# Patient Record
Sex: FEMALE | URBAN - NONMETROPOLITAN AREA
[De-identification: names, ages, dates, MRNs, and addresses within clinical notes are randomized per-mention and may not be internally consistent; named-entity substitution may affect disease eponyms.]

---

## 2021-03-25 ENCOUNTER — HISTORICAL (OUTPATIENT)
Dept: ADMINISTRATIVE | Facility: HOSPITAL | Age: 46
End: 2021-03-25

## 2022-05-02 ENCOUNTER — HISTORICAL (OUTPATIENT)
Dept: ADMINISTRATIVE | Facility: HOSPITAL | Age: 47
End: 2022-05-02

## 2024-04-04 ENCOUNTER — OFFICE VISIT (OUTPATIENT)
Dept: OBSTETRICS AND GYNECOLOGY | Facility: CLINIC | Age: 49
End: 2024-04-04
Payer: COMMERCIAL

## 2024-04-04 VITALS
SYSTOLIC BLOOD PRESSURE: 132 MMHG | DIASTOLIC BLOOD PRESSURE: 78 MMHG | BODY MASS INDEX: 37.43 KG/M2 | TEMPERATURE: 98 F | HEIGHT: 62 IN | WEIGHT: 203.38 LBS

## 2024-04-04 DIAGNOSIS — N89.8 VAGINAL DISCHARGE: ICD-10-CM

## 2024-04-04 DIAGNOSIS — N89.8 VAGINAL DRYNESS: ICD-10-CM

## 2024-04-04 DIAGNOSIS — Z12.31 ENCOUNTER FOR SCREENING MAMMOGRAM FOR BREAST CANCER: ICD-10-CM

## 2024-04-04 DIAGNOSIS — N94.10 DYSPAREUNIA IN FEMALE: ICD-10-CM

## 2024-04-04 DIAGNOSIS — Z79.890 HORMONE REPLACEMENT THERAPY (HRT): ICD-10-CM

## 2024-04-04 DIAGNOSIS — Z01.419 WELL WOMAN EXAM WITH ROUTINE GYNECOLOGICAL EXAM: Primary | ICD-10-CM

## 2024-04-04 PROCEDURE — 1159F MED LIST DOCD IN RCRD: CPT | Mod: CPTII,,,

## 2024-04-04 PROCEDURE — 99386 PREV VISIT NEW AGE 40-64: CPT | Mod: ,,,

## 2024-04-04 PROCEDURE — 1160F RVW MEDS BY RX/DR IN RCRD: CPT | Mod: CPTII,,,

## 2024-04-04 PROCEDURE — 3008F BODY MASS INDEX DOCD: CPT | Mod: CPTII,,,

## 2024-04-04 PROCEDURE — 3078F DIAST BP <80 MM HG: CPT | Mod: CPTII,,,

## 2024-04-04 PROCEDURE — 3075F SYST BP GE 130 - 139MM HG: CPT | Mod: CPTII,,,

## 2024-04-04 RX ORDER — ESTRADIOL 0.03 MG/D
1 FILM, EXTENDED RELEASE TRANSDERMAL
Qty: 8 PATCH | Refills: 11 | Status: SHIPPED | OUTPATIENT
Start: 2024-04-04 | End: 2024-04-08 | Stop reason: SDUPTHER

## 2024-04-08 ENCOUNTER — TELEPHONE (OUTPATIENT)
Dept: OBSTETRICS AND GYNECOLOGY | Facility: CLINIC | Age: 49
End: 2024-04-08
Payer: COMMERCIAL

## 2024-04-08 ENCOUNTER — HOSPITAL ENCOUNTER (OUTPATIENT)
Dept: RADIOLOGY | Facility: HOSPITAL | Age: 49
Discharge: HOME | End: 2024-04-08
Payer: COMMERCIAL

## 2024-04-08 DIAGNOSIS — Z12.31 ENCOUNTER FOR SCREENING MAMMOGRAM FOR BREAST CANCER: ICD-10-CM

## 2024-04-08 DIAGNOSIS — Z79.890 HORMONE REPLACEMENT THERAPY (HRT): Primary | ICD-10-CM

## 2024-04-08 DIAGNOSIS — A49.1 INFECTION DUE TO ENTEROCOCCUS: Primary | ICD-10-CM

## 2024-04-08 PROCEDURE — 77063 BREAST TOMOSYNTHESIS BI: CPT | Mod: TC

## 2024-04-08 RX ORDER — AMPICILLIN 500 MG/1
500 CAPSULE ORAL EVERY 6 HOURS
Qty: 28 CAPSULE | Refills: 0 | Status: SHIPPED | OUTPATIENT
Start: 2024-04-08 | End: 2024-04-15

## 2024-04-08 RX ORDER — ESTRADIOL 0.03 MG/D
1 FILM, EXTENDED RELEASE TRANSDERMAL
Qty: 8 PATCH | Refills: 11 | Status: SHIPPED | OUTPATIENT
Start: 2024-04-08 | End: 2024-05-30

## 2024-04-08 NOTE — TELEPHONE ENCOUNTER
----- Message from DREW Verduzco sent at 4/8/2024  8:43 AM CDT -----  Please notify pt of +enterococcus on one swab. She will need to be treated with Ampicillin 500mg QID x7 days. Thanks!

## 2024-04-08 NOTE — TELEPHONE ENCOUNTER
Spoke with , will send Rx to [facility] pharmacy. If unable to pick of medication due to cost, will change Rx. Pt voiced understanding.

## 2024-04-08 NOTE — TELEPHONE ENCOUNTER
----- Message from Brylee Guillory sent at 4/8/2024  3:55 PM CDT -----  Regarding: Pt Advice  Contact: Neymar  Type:  RX Refill Request    Who Called: Neymar  Refill or New Rx:  RX Name and Strength:estradioL (VIVELLE-DOT) 0.025 mg/24 hr  How is the patient currently taking it? (ex. 1XDay):  Is this a 30 day or 90 day RX:  Preferred Pharmacy with phone number:  [facility] - [place] - [address] [place] [address]  Phone: [phone] Fax: [phone]  Local or Mail Order:  Ordering Provider:  Would the patient rather a call back or a response via [facility]? Call back  Best Call Back Number:[phone]   Additional Information: Pt is wanting to know if there is a different brand patch that can be prescribed to her. Pt states that the patch that was prescribed is to expensive for her.

## 2024-04-15 ENCOUNTER — TELEPHONE (OUTPATIENT)
Dept: OBSTETRICS AND GYNECOLOGY | Facility: CLINIC | Age: 49
End: 2024-04-15
Payer: COMMERCIAL

## 2024-04-15 NOTE — TELEPHONE ENCOUNTER
----- Message from Brylee Guillory sent at 4/15/2024 11:22 AM CDT -----  Regarding: Results  Contact: CHINTAN  Type:  Test Results    Who Called: Chintan  Name of Test (Lab/Mammo/Etc): Mammogram Results  Date of Test:   Ordering Provider:   Where the test was performed: [facility]  Would the patient rather a call back or a response via [facility]? Call back  Best Call Back Number: [phone]   Additional Information:  Pt called wanting her mammogram results

## 2024-04-15 NOTE — TELEPHONE ENCOUNTER
Per MMG report, Recommend 3D left diagnostic mammogram and possible targeted left breast ultrasound. Our nurse navigator, Raz Dos Santos, will call the patient to facilitate scheduling the patient for diagnostic exam.     Placed call to pt, notified. Voiced understanding. To call our office if not contacted to schedule appt.

## 2024-04-17 ENCOUNTER — TELEPHONE (OUTPATIENT)
Dept: OBSTETRICS AND GYNECOLOGY | Facility: CLINIC | Age: 49
End: 2024-04-17
Payer: COMMERCIAL

## 2024-04-17 NOTE — TELEPHONE ENCOUNTER
Spoke with she stated that she received a phone called already about her breast ultrasound which is scheduled for 5/8/24

## 2024-04-17 NOTE — TELEPHONE ENCOUNTER
----- Message from Gracie Bryant sent at 4/17/2024  1:24 PM CDT -----  Regarding: Ultrasound order    Who Called:  patient  Best Call Back Number:  [phone]  Additional Information:  left message with the answering service stating she needs orders for an ultrasound on breast

## 2024-04-23 ENCOUNTER — HOSPITAL ENCOUNTER (OUTPATIENT)
Dept: RADIOLOGY | Facility: HOSPITAL | Age: 49
Discharge: HOME | End: 2024-04-23
Payer: COMMERCIAL

## 2024-04-23 DIAGNOSIS — R92.8 ABNORMAL MAMMOGRAM: ICD-10-CM

## 2024-04-23 PROCEDURE — 76642 ULTRASOUND BREAST LIMITED: CPT | Mod: 26,LT,, | Performed by: STUDENT IN AN ORGANIZED HEALTH CARE EDUCATION/TRAINING PROGRAM

## 2024-04-23 PROCEDURE — 76642 ULTRASOUND BREAST LIMITED: CPT | Mod: TC,LT

## 2024-04-23 PROCEDURE — 77065 DX MAMMO INCL CAD UNI: CPT | Mod: TC,LT

## 2024-04-23 PROCEDURE — 77061 BREAST TOMOSYNTHESIS UNI: CPT | Mod: 26,LT,, | Performed by: STUDENT IN AN ORGANIZED HEALTH CARE EDUCATION/TRAINING PROGRAM

## 2024-04-23 PROCEDURE — 77065 DX MAMMO INCL CAD UNI: CPT | Mod: 26,LT,, | Performed by: STUDENT IN AN ORGANIZED HEALTH CARE EDUCATION/TRAINING PROGRAM

## 2024-04-23 PROCEDURE — 77061 BREAST TOMOSYNTHESIS UNI: CPT | Mod: TC,LT

## 2024-05-04 NOTE — PROGRESS NOTES
Chief Complaint:   New Gynecological (Annual gyn exam s/p hyst, pt c/o of vaginal dryness and pain with sexual intercourse)     History of Present Illness:  Neymar Nam is a 49 y.o. year old  presents for her well woman exam and establishment of care. She is status post hysterectomy. No vaginal bleeding following hysterectomy. Denies any health changes. She does c/o vaginal dryness with causes intercourse to be painful. Pt desires HRT.     MENOPAUSAL:  Age at menarche: 8  Age at menopause: 25  Hysterectomy:  Yes due to tumor in uterus  Last pap: pt does not remember when  H/o abnl pap: no  Postcoital Bleeding: No  Sexually Active: yes  Dyspareunia: Yes  H/o STI: No   HRT: none  MM22 benign  H/o abnl MMG: no  Colonoscopy:  normal per pt     Review of Systems:  General/Constitutional: Chills denies. Fatigue/weakness denies. Fever denies. Night sweats denies. Hot flashes denies    Respiratory: Cough denies. Hemoptysis denies. SOB denies. Sputum production denies. Wheezing denies .   Cardiovascular: Chest pain denies. Dizziness denies. Palpitations denies. Swelling in hands/feet denies.                Breast: Dimpling denies. Breast mass denies. Breast pain/tenderness denies. Nipple discharge denies. Puckering denies.  Gastrointestinal: Abdominal pain denies. Blood in stool denies. Constipation denies. Diarrhea denies. Heartburn denies. Nausea denies. Vomiting denies    Genitourinary: Incontinence denies. Blood in urine denies. Frequent urination denies. Painful urination denies. Urinary urgency denies. Nocturia denies    Gynecologic: Irregular menses denies. Heavy bleeding denies. Painful menses denies. Vaginal discharge denies. Vaginal odor denies. Vaginal itching denies. Vaginal lesion denies. Pelvic pain denies. Decreased libido denies. Vulvar lesion denies. Prolapse of genital organs denies. Painful intercourse admits. Postcoital bleeding denies, +vaginal dryness   Psychiatric: Depression  "denies. Anxiety denies.     OB History    Para Term  AB Living   2 2 2 0 0 2   SAB IAB Ectopic Multiple Live Births   0 0 0 0 2      # 1 - Date: 95, Sex: Female, Weight: None, GA: None, Type: Vaginal, Spontaneous, Apgar1: None, Apgar5: None, Living: Living, Birth Comments: None    # 2 - Date: 97, Sex: Male, Weight: None, GA: None, Type: Vaginal, Spontaneous, Apgar1: None, Apgar5: None, Living: Living, Birth Comments: None      Past Medical History:   Diagnosis Date    Crohn's disease     GERD (gastroesophageal reflux disease)        Current Outpatient Medications:     estradioL (VIVELLE-DOT) 0.025 mg/24 hr, Place 1 patch onto the skin twice a week. Apply twice weekly, Disp: 8 patch, Rfl: 11    Review of patient's allergies indicates:  No Known Allergies    Past Surgical History:   Procedure Laterality Date    HYSTERECTOMY      RECTAL SURGERY  2017    tear in rectum, cyst by tailbone inside of rectum    TUBAL LIGATION       Family History   Problem Relation Name Age of Onset    Breast cancer Neg Hx      Colon cancer Neg Hx      Ovarian cancer Neg Hx      Uterine cancer Neg Hx      Cervical cancer Neg Hx       Social History     Socioeconomic History    Marital status:    Tobacco Use    Smoking status: Never    Smokeless tobacco: Never   Substance and Sexual Activity    Alcohol use: Not Currently    Drug use: Never    Sexual activity: Yes     Partners: Male     Birth control/protection: See Surgical Hx       Physical Exam:  /78 (BP Location: Right arm, Patient Position: Sitting, BP Method: Large (Manual))   Temp 97.5 °F (36.4 °C) (Temporal)   Ht 5' 2" (1.575 m)   Wt 92.3 kg (203 lb 6.4 oz)   BMI 37.20 kg/m²     Chaperone: present.       General appearance: healthy, well-nourished and well-developed     Psychiatric: Orientation to time, place and person. Normal mood and affect and active, alert     Skin: Appearance: no rashes or lesions.     Neck:   Neck: supple, FROM, " trachea midline. and no masses   Thyroid: no enlargement or nodules and non-tender.       Cardiovascular:   Auscultation: RRR and no murmur.   Peripheral Vascular: no varicosities, LLE edema, RLE edema, calf tenderness, and palpable cord and pedal pulses intact.     Lungs:   Respiratory effort: no intercostal retractions or accessory muscle usage.   Auscultation: no wheezing, rales/crackles, or rhonchi and clear to auscultation.     Breast:   Inspection/Palpation: no tenderness, discrete/distinct masses, skin changes, or abnormal secretions. Nipple appearance normal.     Abdomen:   Auscultation/Inspection/Palpation: no hepatomegaly, splenomegaly, masses, tenderness or CVA tenderness and soft, non-distended bowel sounds preset.    Hernia: no palpable hernias.     Female Genitalia:    Vulva: no masses, tenderness or lesions    Bladder/Urethra: no urethral discharge or mass, normal meatus, bladder non-distended.    Vagina: no tenderness, erythema, cystocele, rectocele,  or vesicle(s) or ulcers, +creamy white vaginal discharge noted    Cuff intact, no masses, NT, +vaginal atrophy   Adnexa/Parametria: no parametrial tenderness or mass, no adnexal tenderness or ovarian mass.     Lymph Nodes:   Palpation: non tender submandibular nodes, axillary nodes, or inguinal nodes.     Rectal Exam:   Rectum: normal perianal skin.       Assessment/Plan:  1. Well woman exam with routine gynecological exam  Pap   Recommend BSE monthly   Discussed recommendations of annual screening after age of 40 with mammogram  Explained that screening is not 100% reliable. Advised patient if she notices any changes to her breast including a lump, mass, dimpling, discharge, rash, or tenderness she should contact us immediately.     Healthy diet, exercise   MMG  Multivitamin   Seat belt   Sunscreen use   Safe sex/ STI education  Contraception: hysterectomy  Annual labs: per PCP  C-scope: per PCP     2. Vaginal discharge  -     MDL Sendout Test: Leuk,  myco, urea, AV, BV, CV    3. Dyspareunia in female    4. Vaginal dryness    5. Hormone replacement therapy (HRT)   estradioL (VIVELLE-DOT) 0.025 mg/24 hr; Place 1 patch onto the skin twice a week. Apply twice weekly  Dispense: 8 patch; Refill: 11    Patient with vasomotor symptoms of menopause that have not responded to conservative measures. .     Reviewed the physiologic roles of estrogen, progesterone and androgens in the female both positive and negative.       Explained that with menopause comes a dramatic reduction in these hormones and that changes take place in their absence.       Discussed symptoms of decreased hormone levels and that these symptoms usually last 6 months to 2 years.       Reviewed hormone replacement options as well as indications and contraindications.       Discussed the WHI study findings and current FDA recommendations. Also discussed current recommendations for breast screening.     Reviewed precautions when taking female hormones and symptoms to be aware of such as headache, visual change, focal weakness or numbness, SOB,chest pain, pain in thigh or calf, breast pain or lump, and vaginal bleeding. Instructed to call immediately and stop HRT if any of these symptoms occur.       Discussed sexuality.       Answered questions.       Patient understands and wishes to  proceed with trial of Estradiol patch.    6. Encounter for screening mammogram for breast cancer  -     Mammo Digital Screening [facility] w/ [facility]; Future; Expected date: 04/11/2024    Will call pt if results; f/u in 3 months on estradiol patch or prn.

## 2024-05-30 ENCOUNTER — TELEPHONE (OUTPATIENT)
Dept: OBSTETRICS AND GYNECOLOGY | Facility: CLINIC | Age: 49
End: 2024-05-30

## 2024-05-30 ENCOUNTER — OFFICE VISIT (OUTPATIENT)
Dept: OBSTETRICS AND GYNECOLOGY | Facility: CLINIC | Age: 49
End: 2024-05-30
Payer: COMMERCIAL

## 2024-05-30 VITALS
DIASTOLIC BLOOD PRESSURE: 80 MMHG | HEIGHT: 62 IN | BODY MASS INDEX: 36.99 KG/M2 | SYSTOLIC BLOOD PRESSURE: 124 MMHG | WEIGHT: 201 LBS

## 2024-05-30 DIAGNOSIS — Z79.890 HORMONE REPLACEMENT THERAPY (HRT): Primary | ICD-10-CM

## 2024-05-30 DIAGNOSIS — N89.8 VAGINAL DRYNESS: ICD-10-CM

## 2024-05-30 PROCEDURE — 3008F BODY MASS INDEX DOCD: CPT | Mod: CPTII,,,

## 2024-05-30 PROCEDURE — 99213 OFFICE O/P EST LOW 20 MIN: CPT | Mod: ,,,

## 2024-05-30 PROCEDURE — 1160F RVW MEDS BY RX/DR IN RCRD: CPT | Mod: CPTII,,,

## 2024-05-30 PROCEDURE — 3074F SYST BP LT 130 MM HG: CPT | Mod: CPTII,,,

## 2024-05-30 PROCEDURE — 1159F MED LIST DOCD IN RCRD: CPT | Mod: CPTII,,,

## 2024-05-30 PROCEDURE — 3079F DIAST BP 80-89 MM HG: CPT | Mod: CPTII,,,

## 2024-05-30 RX ORDER — ESTRADIOL 0.5 MG/1
0.5 TABLET ORAL DAILY
Qty: 30 TABLET | Refills: 11 | Status: SHIPPED | OUTPATIENT
Start: 2024-05-30 | End: 2025-05-30

## 2024-05-30 RX ORDER — ESTRADIOL 0.03 MG/D
1 PATCH TRANSDERMAL
Qty: 4 PATCH | Refills: 11 | Status: SHIPPED | OUTPATIENT
Start: 2024-05-30 | End: 2025-05-30

## 2024-05-30 NOTE — PROGRESS NOTES
"  Chief Complaint     Follow-up (F/u on estradiol patch. )    HPI:     Patient is a 49 y.o.  presents to follow up Estradiol patch for HRT, vaginal dryness. Reports will experience headaches, "heart racing". Reports will resolve following taking patch off.     Gyn History:    Menstrual History   Cycle: No  Menarche Age: 8 years  No Cycle Reason: (!) Surgical  Surgical Reason: hysterectomy  [place]  Sexually Active: Yes  Sexual Orientation: heterosexual  Postcoital Bleeding: No  Dyspareunia: No  STI History: No  Contraception: No  Menopause  Menopause Age: 25 years  Post Menopausal Bleeding: No  Hormone Replacement Therapy: Yes (Estradiol patch)  Breast History  Last Breast Imaging Date: Yes (ocal asymmetry in the left breast)  Date: 24  History of Abnormal Breast Imaging : (!) Yes  Date: 24 (Left breast US and DX MMG 2024- benign)  History of Breast Biopsy: No  Pap History   Last pap date:  (Patient unsure)  History of Abnormal Pap: No  HPV Vaccine Completed: No (0)        Past Medical History:   Diagnosis Date    Crohn's disease     GERD (gastroesophageal reflux disease)        Past Surgical History:   Procedure Laterality Date    HYSTERECTOMY      RECTAL SURGERY  2017    tear in rectum, cyst by tailbone inside of rectum    TUBAL LIGATION         Family History   Problem Relation Name Age of Onset    Breast cancer Neg Hx      Colon cancer Neg Hx      Ovarian cancer Neg Hx      Uterine cancer Neg Hx      Cervical cancer Neg Hx         OB History          2    Para   2    Term   2            AB        Living   2         SAB        IAB        Ectopic        Multiple        Live Births   2                 Current Outpatient Medications on File Prior to Visit   Medication Sig Dispense Refill    [DISCONTINUED] estradioL (VIVELLE-DOT) 0.025 mg/24 hr Place 1 patch onto the skin twice a week. Apply twice weekly 8 patch 11     No current facility-administered medications on file " "prior to visit.       Review of Systems:       Review of Systems   Constitutional:  Negative for chills and fever.   Gastrointestinal:  Negative for abdominal pain, constipation and diarrhea.   Genitourinary:  Positive for vaginal dryness. Negative for bladder incontinence, decreased libido, dysmenorrhea, dyspareunia, dysuria, flank pain, frequency, genital sores, hematuria, hot flashes, menorrhagia, menstrual problem, pelvic pain, urgency, vaginal bleeding, vaginal discharge, vaginal pain, urinary incontinence, postcoital bleeding, postmenopausal bleeding and vaginal odor.        Physical Exam:    /80   Ht 5' 2" (1.575 m)   Wt 91.2 kg (201 lb)   BMI 36.76 kg/m²     Physical Exam   General Exam:    General Appearance: alert, in no acute distress, normal, well nourished.  Psych:  Orientation: time, place, person.  Mood/Affect: Normal       Assessment:   1. Hormone replacement therapy (HRT)    2. Vaginal dryness    Other orders  -     estradiol 0.025 mg/24 hr 0.025 mg/24 hr; Place 1 patch onto the skin every 7 days.  Dispense: 4 patch; Refill: 11             Plan:   1. Hormone replacement therapy (HRT)    2. Vaginal dryness      Discussed hot flashes, menopausal symptoms and hormone replacement therapy.  Discussed risks of HRT including but not limited to: Deep vein thrombosis, pulmonary emboli, stroke, increased cancer risk, etc.  Discussed Vaginal premarin cream due to main complaint vaginal dryness. Pt declines cream, pill. Desires trial of once weekly patch. If patch is to expensive, we will do estradiol 0.5 po daily.    Rx: Climara patch    RTC 1 month        This note was transcribed by Marcy Irizarry. There may be transcription errors as a result, however minimal. Effort has been made to ensure accuracy of transcription, but any obvious errors or omissions should be clarified with the author of the document.       "

## 2024-05-30 NOTE — TELEPHONE ENCOUNTER
----- Message from Brylee Guillory sent at 5/30/2024  2:05 PM CDT -----  Regarding: Pt Advice  Contact: Neymar  Pt called stating that she was prescribed some medication today (estradiol 0.025 mg/24 hr 0.025 mg/24 hr). She said that the medication is too expensive and is wanting to know if we can call her out something different. Patient call back number [phone].

## 2024-06-27 ENCOUNTER — OFFICE VISIT (OUTPATIENT)
Dept: OBSTETRICS AND GYNECOLOGY | Facility: CLINIC | Age: 49
End: 2024-06-27
Payer: COMMERCIAL

## 2024-06-27 VITALS
DIASTOLIC BLOOD PRESSURE: 76 MMHG | HEIGHT: 62 IN | SYSTOLIC BLOOD PRESSURE: 124 MMHG | BODY MASS INDEX: 37.02 KG/M2 | WEIGHT: 201.19 LBS | TEMPERATURE: 98 F

## 2024-06-27 DIAGNOSIS — N89.8 VAGINAL DRYNESS: ICD-10-CM

## 2024-06-27 DIAGNOSIS — Z79.890 HORMONE REPLACEMENT THERAPY (HRT): Primary | ICD-10-CM

## 2024-06-27 DIAGNOSIS — N94.10 DYSPAREUNIA IN FEMALE: ICD-10-CM

## 2024-06-27 PROCEDURE — 1160F RVW MEDS BY RX/DR IN RCRD: CPT | Mod: CPTII,,,

## 2024-06-27 PROCEDURE — 3074F SYST BP LT 130 MM HG: CPT | Mod: CPTII,,,

## 2024-06-27 PROCEDURE — 1159F MED LIST DOCD IN RCRD: CPT | Mod: CPTII,,,

## 2024-06-27 PROCEDURE — 99213 OFFICE O/P EST LOW 20 MIN: CPT | Mod: ,,,

## 2024-06-27 PROCEDURE — 3008F BODY MASS INDEX DOCD: CPT | Mod: CPTII,,,

## 2024-06-27 PROCEDURE — 3078F DIAST BP <80 MM HG: CPT | Mod: CPTII,,,

## 2024-06-27 NOTE — PROGRESS NOTES
"Chief Complaint:  F/U Estradiol/Discuss Vag Cream  (Reports she would like to discuss starting vaginal cream )    History of Present Illness:  Patient is a 49 y.o.  presents to follow up Estradiol 0.5mg. Previously tried Estradiol patch, states it caused headaches and palpitations. Pt states she takes half a dose d/t full dose making her "neck muscles tense." States it has improved her hot flashes and fatigue. Pt states she would like to start the Premarin cream as she has noticed minimal improvement in vaginal dryness.       Gyn History:  Menstrual History   Cycle: No  Menarche Age: 8 years  No Cycle Reason: (!) Surgical  Surgical Reason: hysterectomy  [place]  Sexually Active: Yes  Sexual Orientation: heterosexual  Postcoital Bleeding: No  Dyspareunia: No  STI History: No  Contraception: No  Menopause  Menopause Age: 25 years  Post Menopausal Bleeding: No  Hormone Replacement Therapy: Yes (Estradiol)  Breast History  Last Breast Imaging Date: Yes  Date: 24  History of Abnormal Breast Imaging : (!) Yes ((Left breast US and DX MMG 2024- benign))  History of Breast Biopsy: No  Pap History   History of Abnormal Pap: No  HPV Vaccine Completed: N/a      Review of systems:  General/Constitutional: Chills denies. Fatigue/weakness denies. Fever denies. Night sweats denies. Hot flashes denies  Breast: Dimpling denies. Breast mass denies. Breast pain/tenderness denies. Nipple discharge denies. Puckering denies.  Gastrointestinal: Abdominal pain denies. Blood in stool denies. Constipation denies. Diarrhea denies. Heartburn denies. Nausea denies. Vomiting denies   Genitourinary: Incontinence denies. Blood in urine denies. Frequent urination denies. Urgency denies. Painful urination denies. Nocturia denies   Gynecologic: Irregular menses denies. Heavy bleeding denies. Painful menses denies. Vaginal discharge denies. Vaginal odor denies. Vaginal itching/Irritation denies. Vaginal lesion denies.  Pelvic " "pain denies. Decreased libido denies. Vulvar lesion denies. Prolapse of genital organs denies. Painful intercourse denies. Postcoital bleeding denies, +vaginal dryness  Psychiatric: Mood lability denies. Depressed mood denies. Suicidal thoughts denies. Anxiety denies. Overwhelmed denies. Appetite normal. Energy level normal.     OB History    Para Term  AB Living   2 2 2 0 0 2   SAB IAB Ectopic Multiple Live Births   0 0 0 0 2      # 1 - Date: 95, Sex: Female, Weight: None, GA: None, Type: Vaginal, Spontaneous, Apgar1: None, Apgar5: None, Living: Living, Birth Comments: None    # 2 - Date: 97, Sex: Male, Weight: None, GA: None, Type: Vaginal, Spontaneous, Apgar1: None, Apgar5: None, Living: Living, Birth Comments: None      Past Medical History:   Diagnosis Date    Crohn's disease     GERD (gastroesophageal reflux disease)        Current Outpatient Medications:     estradioL (ESTRACE) 0.5 MG tablet, Take 1 tablet (0.5 mg total) by mouth once daily., Disp: 30 tablet, Rfl: 11    conjugated estrogens (PREMARIN) vaginal cream, Place 0.5 g vaginally twice a week., Disp: 1 applicator, Rfl: 1      Physical Exam:  /76   Temp 98.2 °F (36.8 °C)   Ht 5' 2" (1.575 m)   Wt 91.3 kg (201 lb 3.2 oz)   BMI 36.80 kg/m²     Constitutional: General appearance: healthy, well-nourished and well-developed   Psychiatric:  Orientation to time, place and person. Normal mood and affect and active, alert       Assessment/Plan:  1. Hormone replacement therapy (HRT)  -Continue Estradiol 0.25 po    Patient with vasomotor symptoms of menopause that have not responded to conservative measures    Reviewed the physiologic roles of estrogen, progesterone and androgens in the female both positive and negative    Explained that with menopause comes a dramatic reduction in these hormones and that changes take place in their absence    Discussed symptoms of decreased hormone levels and that these symptoms usually last 6 " months to 2 years    Reviewed hormone replacement options as well as indications and contraindications    Discussed the WHI study findings and current FDA recommendations. Also discussed current recommendations for breast screening    Reviewed precautions when taking female hormones and symptoms to be aware of such as headache, visual change, focal weakness or numbness, SOB,chest pain, pain in thigh or calf, breast pain or lump, and vaginal bleeding. Instructed to call immediately and stop HRT if any of these symptoms occur    Discussed sexuality  Answered questions    2. Vaginal dryness  -     conjugated estrogens (PREMARIN) vaginal cream; Place 0.5 g vaginally twice a week.  Dispense: 1 applicator; Refill: 1    3. Dyspareunia in female  -     conjugated estrogens (PREMARIN) vaginal cream; Place 0.5 g vaginally twice a week.  Dispense: 1 applicator; Refill: 1       RTC in 3 months to f/u on Premarin cream or PRN.

## 2024-10-22 ENCOUNTER — HOSPITAL ENCOUNTER (OUTPATIENT)
Dept: RADIOLOGY | Facility: HOSPITAL | Age: 49
Discharge: HOME | End: 2024-10-22
Attending: INTERNAL MEDICINE
Payer: COMMERCIAL

## 2024-10-22 DIAGNOSIS — R10.84 ABDOMINAL PAIN, GENERALIZED: ICD-10-CM

## 2024-10-22 DIAGNOSIS — R11.0 NAUSEA: ICD-10-CM

## 2024-10-22 PROCEDURE — 74019 RADEX ABDOMEN 2 VIEWS: CPT | Mod: TC

## 2024-10-22 PROCEDURE — 76700 US EXAM ABDOM COMPLETE: CPT | Mod: TC

## 2024-10-29 ENCOUNTER — HOSPITAL ENCOUNTER (OUTPATIENT)
Dept: RADIOLOGY | Facility: HOSPITAL | Age: 49
Discharge: HOME | End: 2024-10-29
Attending: INTERNAL MEDICINE
Payer: COMMERCIAL

## 2024-10-29 VITALS — BODY MASS INDEX: 36.58 KG/M2 | WEIGHT: 200 LBS

## 2024-10-29 DIAGNOSIS — R10.9 AP (ABDOMINAL PAIN): ICD-10-CM

## 2024-10-29 PROCEDURE — 78227 HEPATOBIL SYST IMAGE W/DRUG: CPT | Mod: TC

## 2024-10-29 PROCEDURE — 63600175 PHARM REV CODE 636 W HCPCS: Performed by: INTERNAL MEDICINE

## 2024-10-29 PROCEDURE — 25000003 PHARM REV CODE 250: Performed by: INTERNAL MEDICINE

## 2024-10-29 PROCEDURE — A9537 TC99M MEBROFENIN: HCPCS | Performed by: INTERNAL MEDICINE

## 2024-10-29 RX ORDER — KIT FOR THE PREPARATION OF TECHNETIUM TC 99M MEBROFENIN 45 MG/10ML
4.1 INJECTION, POWDER, LYOPHILIZED, FOR SOLUTION INTRAVENOUS
Status: COMPLETED | OUTPATIENT
Start: 2024-10-29 | End: 2024-10-29

## 2024-10-29 RX ORDER — SINCALIDE 5 UG/5ML
0.02 INJECTION, POWDER, LYOPHILIZED, FOR SOLUTION INTRAVENOUS ONCE
Status: DISCONTINUED | OUTPATIENT
Start: 2024-10-29 | End: 2024-10-29

## 2024-10-29 RX ADMIN — SODIUM CHLORIDE: 9 INJECTION, SOLUTION INTRAVENOUS at 03:10

## 2024-10-29 RX ADMIN — MEBROFENIN 4.1 MILLICURIE: 45 INJECTION, POWDER, LYOPHILIZED, FOR SOLUTION INTRAVENOUS at 02:10

## 2025-04-10 ENCOUNTER — OFFICE VISIT (OUTPATIENT)
Dept: OBSTETRICS AND GYNECOLOGY | Facility: CLINIC | Age: 50
End: 2025-04-10
Payer: COMMERCIAL

## 2025-04-10 VITALS
DIASTOLIC BLOOD PRESSURE: 82 MMHG | WEIGHT: 198 LBS | BODY MASS INDEX: 36.44 KG/M2 | TEMPERATURE: 98 F | HEIGHT: 62 IN | SYSTOLIC BLOOD PRESSURE: 122 MMHG

## 2025-04-10 DIAGNOSIS — Z90.710 H/O: HYSTERECTOMY: ICD-10-CM

## 2025-04-10 DIAGNOSIS — N89.8 VAGINAL DISCHARGE: ICD-10-CM

## 2025-04-10 DIAGNOSIS — B37.31 VAGINAL CANDIDIASIS: ICD-10-CM

## 2025-04-10 DIAGNOSIS — Z12.31 SCREENING MAMMOGRAM FOR BREAST CANCER: ICD-10-CM

## 2025-04-10 DIAGNOSIS — Z12.31 BREAST CANCER SCREENING BY MAMMOGRAM: ICD-10-CM

## 2025-04-10 DIAGNOSIS — Z01.419 WELL WOMAN EXAM WITH ROUTINE GYNECOLOGICAL EXAM: Primary | ICD-10-CM

## 2025-04-10 LAB
BACTERIA HYPHAE, POC: NEGATIVE
GARDNERELLA VAGINALIS: NEGATIVE
OTHER MICROSC. OBSERVATIONS: ABNORMAL
POC BACTERIAL VAGINOSIS: NEGATIVE
POC CLUE CELLS: NEGATIVE
TRICHOMONAS, POC: NEGATIVE
YEAST WET PREP: POSITIVE
YEAST, POC: POSITIVE

## 2025-04-10 PROCEDURE — 99396 PREV VISIT EST AGE 40-64: CPT | Mod: ,,,

## 2025-04-10 RX ORDER — CLOTRIMAZOLE AND BETAMETHASONE DIPROPIONATE 10; .64 MG/G; MG/G
CREAM TOPICAL 2 TIMES DAILY
Qty: 45 G | Refills: 1 | Status: SHIPPED | OUTPATIENT
Start: 2025-04-10

## 2025-04-10 RX ORDER — TIRZEPATIDE 2.5 MG/.5ML
2.5 INJECTION, SOLUTION SUBCUTANEOUS
COMMUNITY
Start: 2025-03-25

## 2025-04-10 RX ORDER — FLUCONAZOLE 200 MG/1
200 TABLET ORAL EVERY OTHER DAY
Qty: 4 TABLET | Refills: 0 | Status: SHIPPED | OUTPATIENT
Start: 2025-04-10 | End: 2025-04-17

## 2025-04-23 ENCOUNTER — HOSPITAL ENCOUNTER (OUTPATIENT)
Dept: RADIOLOGY | Facility: HOSPITAL | Age: 50
Discharge: HOME | End: 2025-04-23
Payer: COMMERCIAL

## 2025-04-23 DIAGNOSIS — Z12.31 SCREENING MAMMOGRAM FOR BREAST CANCER: ICD-10-CM

## 2025-04-23 PROCEDURE — 77063 BREAST TOMOSYNTHESIS BI: CPT | Mod: TC

## 2025-05-16 ENCOUNTER — HOSPITAL ENCOUNTER (OUTPATIENT)
Dept: PREADMISSION TESTING | Facility: HOSPITAL | Age: 50
Discharge: HOME | End: 2025-05-16
Attending: INTERNAL MEDICINE
Payer: COMMERCIAL

## 2025-05-16 VITALS — BODY MASS INDEX: 36.44 KG/M2 | WEIGHT: 198 LBS | HEIGHT: 62 IN

## 2025-05-16 DIAGNOSIS — Z12.11 COLON CANCER SCREENING: Primary | ICD-10-CM

## 2025-05-16 DIAGNOSIS — Z12.11 SCREENING FOR COLON CANCER: ICD-10-CM

## 2025-05-16 RX ORDER — TIRZEPATIDE 5 MG/.5ML
5 INJECTION, SOLUTION SUBCUTANEOUS
COMMUNITY

## 2025-05-16 RX ORDER — SODIUM CHLORIDE, SODIUM LACTATE, POTASSIUM CHLORIDE, CALCIUM CHLORIDE 600; 310; 30; 20 MG/100ML; MG/100ML; MG/100ML; MG/100ML
INJECTION, SOLUTION INTRAVENOUS CONTINUOUS
Status: CANCELLED | OUTPATIENT
Start: 2025-05-20

## 2025-05-16 NOTE — DISCHARGE INSTRUCTIONS

## 2025-05-19 ENCOUNTER — ANESTHESIA EVENT (OUTPATIENT)
Dept: GASTROENTEROLOGY | Facility: HOSPITAL | Age: 50
End: 2025-05-19
Payer: COMMERCIAL

## 2025-05-19 NOTE — PROGRESS NOTES
[facility]    History & Physical      Patient Name: Neymar Nam  MRN: [id number]  Admission Date: (Not on file)  Attending Physician: Solomon Chawla MD  Primary Care Provider: Solomon Chawla MD           Subjective:         Chief Complaint:  Here for a colonoscopy       HPI:  The patient is a 50-year-old female with a history of diabetes who is here for a screening colonoscopy.  She denies any melena or hematochezia.    Past Medical History:   Diagnosis Date    Crohn's disease     Diabetes mellitus     GERD (gastroesophageal reflux disease)        Past Surgical History:   Procedure Laterality Date    COLONOSCOPY      x 3    EGD, WITH BALLOON DILATION N/A     HYSTERECTOMY      RECTAL SURGERY  04/2017    tear in rectum, cyst by tailbone inside of rectum    TUBAL LIGATION         Review of patient's allergies indicates:   Allergen Reactions    Diflucan [fluconazole] Hives       Current Outpatient Medications on File Prior to Visit   Medication Sig    clotrimazole-betamethasone 1-0.05% (LOTRISONE) cream Apply topically 2 (two) times daily.    MOUNJARO 2.5 mg/0.5 mL PnIj Inject 2.5 mg into the skin every 7 days.    tirzepatide (MOUNJARO) 5 mg/0.5 mL PnIj Inject 5 mg into the skin every 7 days. Was  on 2.5 now up to 5mg   holding till after procedure   5/20/2025,  last taken on Tuesday 5/13/2025     No current facility-administered medications on file prior to visit.     Family History    None       Tobacco Use    Smoking status: Never     Passive exposure: Past    Smokeless tobacco: Never   Substance and Sexual Activity    Alcohol use: Not Currently    Drug use: Never    Sexual activity: Yes     Partners: Male     Birth control/protection: See Surgical Hx     Review of Systems   All other systems reviewed and are negative.  Objective:     Vital Signs (Most Recent):    Vital Signs (24h Range):           There is no height or weight on file to calculate BMI.    Physical  Exam  Constitutional:       Appearance: Normal appearance.   HENT:      Head: Normocephalic and atraumatic.      Mouth/Throat:      Mouth: Mucous membranes are moist.   Eyes:      Extraocular Movements: Extraocular movements intact.      Pupils: Pupils are equal, round, and reactive to light.   Cardiovascular:      Rate and Rhythm: Normal rate and regular rhythm.   Pulmonary:      Effort: Pulmonary effort is normal.      Breath sounds: Normal breath sounds.   Abdominal:      General: Bowel sounds are normal.      Palpations: Abdomen is soft.   Musculoskeletal:         General: Normal range of motion.      Cervical back: Normal range of motion and neck supple.   Neurological:      General: No focal deficit present.      Mental Status: She is alert and oriented to person, place, and time.   Psychiatric:         Mood and Affect: Mood normal.         Behavior: Behavior normal.       CRANIAL NERVES     CN III, IV, VI   Pupils are equal, round, and reactive to light.        Assessment/Plan:     Impression:  50-year-old female in need of a screening colonoscopy    Plan: Proceed with screening colonoscopy    VTE Risk Mitigation (From admission, onward)      None              Solomon Chawla MD  Department of Hospital Medicine   [facility]

## 2025-05-19 NOTE — ANESTHESIA PREPROCEDURE EVALUATION
05/19/2025  Neymar Nam is a 50 y.o., female presents for colonoscopy        Anesthesia History    No specialty history recorded    Other Medical History   GERD (gastroesophageal reflux disease) Crohn's disease   Diabetes mellitus        Surgical History    TUBAL LIGATION HYSTERECTOMY   RECTAL SURGERY COLONOSCOPY   EGD, WITH BALLOON DILATION        Prescription Medications  Within last 14 days from 05/19/25   Last Taken Last Updated   clotrimazole-betamethasone 1-0.05% (LOTRISONE) cream        MOUNJARO 2.5 mg/0.5 mL PnIj        tirzepatide (MOUNJARO) 5 mg/0.5 mL PnIj              Pre-op Assessment    I have reviewed the Patient Summary Reports.     I have reviewed the Nursing Notes. I have reviewed the NPO Status.   I have reviewed the Medications.     Review of Systems  Anesthesia Hx:  No problems with previous Anesthesia             Denies Family Hx of Anesthesia complications.    Denies Personal Hx of Anesthesia complications.                    Social:  No Alcohol Use, Non-Smoker       Hematology/Oncology:  Hematology Normal   Oncology Normal                                   EENT/Dental:  EENT/Dental Normal           Cardiovascular:  Cardiovascular Normal                                              Pulmonary:  Pulmonary Normal                       Renal/:  Renal/ Normal                 Hepatic/GI:     GERD    Taking GLP-1 Agonists Instructed to Hold for 7 Days Reported GI Symptoms  Esophageal / Stomach Disorders Gerd, Gastric Conditions:   Bowel Conditions:  Inflammatory Bowel Disease, Crohns        Musculoskeletal:  Musculoskeletal Normal                Neurological:  Neurology Normal                                      Endocrine:  Diabetes, type 2    Diabetes                    Obesity / BMI > 30  Dermatological:  Skin Normal    Psych:  Psychiatric Normal                    Physical  Exam  General: Well nourished, Cooperative, Alert and Oriented    Airway:  Mallampati: II / III  Mouth Opening: Small, but > 3cm  TM Distance: Normal  Tongue: Normal  Neck ROM: Normal ROM    Dental:  Intact        Anesthesia Plan  Type of Anesthesia, risks & benefits discussed:    Anesthesia Type: MAC  Intra-op Monitoring Plan: Standard ASA Monitors  Post Op Pain Control Plan:   (medical reason for not using multimodal pain management)  Induction:  IV  Informed Consent: Informed consent signed with the Patient and all parties understand the risks and agree with anesthesia plan.  All questions answered. Patient consented to blood products? Yes  ASA Score: 3  Day of Surgery Review of History & Physical: H&P Update referred to the surgeon/provider.I have interviewed and examined the patient. I have reviewed the patient's H&P dated: There are no significant changes.     Ready For Surgery From Anesthesia Perspective.     .

## 2025-05-20 ENCOUNTER — HOSPITAL ENCOUNTER (OUTPATIENT)
Dept: GASTROENTEROLOGY | Facility: HOSPITAL | Age: 50
Discharge: HOME | End: 2025-05-20
Attending: INTERNAL MEDICINE
Payer: COMMERCIAL

## 2025-05-20 ENCOUNTER — ANESTHESIA (OUTPATIENT)
Dept: GASTROENTEROLOGY | Facility: HOSPITAL | Age: 50
End: 2025-05-20
Payer: COMMERCIAL

## 2025-05-20 VITALS
OXYGEN SATURATION: 99 % | RESPIRATION RATE: 18 BRPM | HEART RATE: 99 BPM | DIASTOLIC BLOOD PRESSURE: 88 MMHG | WEIGHT: 198 LBS | BODY MASS INDEX: 36.21 KG/M2 | SYSTOLIC BLOOD PRESSURE: 130 MMHG | TEMPERATURE: 98 F

## 2025-05-20 DIAGNOSIS — Z12.11 SCREENING FOR COLON CANCER: ICD-10-CM

## 2025-05-20 DIAGNOSIS — Z12.11 COLON CANCER SCREENING: ICD-10-CM

## 2025-05-20 DIAGNOSIS — Z12.11 SCREEN FOR COLON CANCER: ICD-10-CM

## 2025-05-20 LAB — POCT GLUCOSE: 96 MG/DL (ref 70–110)

## 2025-05-20 PROCEDURE — 63600175 PHARM REV CODE 636 W HCPCS: Performed by: NURSE ANESTHETIST, CERTIFIED REGISTERED

## 2025-05-20 PROCEDURE — 37000008 HC ANESTHESIA 1ST 15 MINUTES

## 2025-05-20 PROCEDURE — D9220A PRA ANESTHESIA: Mod: ,,, | Performed by: NURSE ANESTHETIST, CERTIFIED REGISTERED

## 2025-05-20 PROCEDURE — 82962 GLUCOSE BLOOD TEST: CPT

## 2025-05-20 PROCEDURE — 37000009 HC ANESTHESIA EA ADD 15 MINS

## 2025-05-20 PROCEDURE — 63600175 PHARM REV CODE 636 W HCPCS: Performed by: INTERNAL MEDICINE

## 2025-05-20 RX ORDER — GLYCOPYRROLATE 0.2 MG/ML
INJECTION INTRAMUSCULAR; INTRAVENOUS
Status: DISCONTINUED | OUTPATIENT
Start: 2025-05-20 | End: 2025-05-20

## 2025-05-20 RX ORDER — SODIUM CHLORIDE, SODIUM LACTATE, POTASSIUM CHLORIDE, CALCIUM CHLORIDE 600; 310; 30; 20 MG/100ML; MG/100ML; MG/100ML; MG/100ML
INJECTION, SOLUTION INTRAVENOUS CONTINUOUS
Status: DISCONTINUED | OUTPATIENT
Start: 2025-05-20 | End: 2025-05-21 | Stop reason: HOSPADM

## 2025-05-20 RX ORDER — LIDOCAINE HYDROCHLORIDE 20 MG/ML
INJECTION INTRAVENOUS
Status: DISCONTINUED | OUTPATIENT
Start: 2025-05-20 | End: 2025-05-20

## 2025-05-20 RX ORDER — PROPOFOL 10 MG/ML
VIAL (ML) INTRAVENOUS
Status: DISCONTINUED | OUTPATIENT
Start: 2025-05-20 | End: 2025-05-20

## 2025-05-20 RX ADMIN — PROPOFOL 30 MG: 10 INJECTION, EMULSION INTRAVENOUS at 07:05

## 2025-05-20 RX ADMIN — SODIUM CHLORIDE, POTASSIUM CHLORIDE, SODIUM LACTATE AND CALCIUM CHLORIDE: 600; 310; 30; 20 INJECTION, SOLUTION INTRAVENOUS at 06:05

## 2025-05-20 RX ADMIN — PROPOFOL 70 MG: 10 INJECTION, EMULSION INTRAVENOUS at 07:05

## 2025-05-20 RX ADMIN — LIDOCAINE HYDROCHLORIDE 50 MG: 20 INJECTION, SOLUTION INTRAVENOUS at 07:05

## 2025-05-20 RX ADMIN — GLYCOPYRROLATE 0.2 MG: 0.2 INJECTION INTRAMUSCULAR; INTRAVENOUS at 07:05

## 2025-05-20 NOTE — OR NURSING
Patient is back to her room in stable condition, No difficulty breathing or swallowing, No s/s of distress noted, SR up x 2 with call bell in reach, Close to nurse's station, Positive flatus,

## 2025-05-20 NOTE — H&P
[facility]     History & Physical        Patient Name: Neymar Nam  MRN: [id number]  Admission Date: (Not on file)  Attending Physician: Solomon Chawla MD  Primary Care Provider: Solomon Chawla MD              Subjective:            Chief Complaint:  Here for a colonoscopy        HPI:  The patient is a 50-year-old female with a history of diabetes who is here for a screening colonoscopy.  She denies any melena or hematochezia.          Past Medical History:   Diagnosis Date    Crohn's disease      Diabetes mellitus      GERD (gastroesophageal reflux disease)                 Past Surgical History:   Procedure Laterality Date    COLONOSCOPY         x 3    EGD, WITH BALLOON DILATION N/A      HYSTERECTOMY        RECTAL SURGERY   04/2017     tear in rectum, cyst by tailbone inside of rectum    TUBAL LIGATION                  Review of patient's allergies indicates:   Allergen Reactions    Diflucan [fluconazole] Hives              Current Outpatient Medications on File Prior to Visit   Medication Sig    clotrimazole-betamethasone 1-0.05% (LOTRISONE) cream Apply topically 2 (two) times daily.    MOUNJARO 2.5 mg/0.5 mL PnIj Inject 2.5 mg into the skin every 7 days.    tirzepatide (MOUNJARO) 5 mg/0.5 mL PnIj Inject 5 mg into the skin every 7 days. Was  on 2.5 now up to 5mg   holding till after procedure   5/20/2025,  last taken on Tuesday 5/13/2025      No current facility-administered medications on file prior to visit.      Family History    None               Tobacco Use    Smoking status: Never       Passive exposure: Past    Smokeless tobacco: Never   Substance and Sexual Activity    Alcohol use: Not Currently    Drug use: Never    Sexual activity: Yes       Partners: Male       Birth control/protection: See Surgical Hx      Review of Systems   All other systems reviewed and are negative.  Objective:      Vital Signs (Most Recent): Vital Signs (24h Range):      There is no  height or weight on file to calculate BMI.     Physical Exam  Constitutional:       Appearance: Normal appearance.   HENT:      Head: Normocephalic and atraumatic.      Mouth/Throat:      Mouth: Mucous membranes are moist.   Eyes:      Extraocular Movements: Extraocular movements intact.      Pupils: Pupils are equal, round, and reactive to light.   Cardiovascular:      Rate and Rhythm: Normal rate and regular rhythm.   Pulmonary:      Effort: Pulmonary effort is normal.      Breath sounds: Normal breath sounds.   Abdominal:      General: Bowel sounds are normal.      Palpations: Abdomen is soft.   Musculoskeletal:         General: Normal range of motion.      Cervical back: Normal range of motion and neck supple.   Neurological:      General: No focal deficit present.      Mental Status: She is alert and oriented to person, place, and time.   Psychiatric:         Mood and Affect: Mood normal.         Behavior: Behavior normal.         CRANIAL NERVES      CN III, IV, VI   Pupils are equal, round, and reactive to light.           Assessment/Plan:      Impression:  50-year-old female in need of a screening colonoscopy     Plan: Proceed with screening colonoscopy     VTE Risk Mitigation (From admission, onward)        None                   Solomon Chawla MD  Department of Hospital Medicine   [facility]

## 2025-05-20 NOTE — OR NURSING
Patient is drinking apple juice and tolerating it well, No s/s of distress noted, SR up x 2 with call bell in reach, Close to nurse's station, Stable condition

## 2025-05-20 NOTE — ANESTHESIA POSTPROCEDURE EVALUATION
Anesthesia Post Evaluation    Patient: Neymar Nam    Procedure(s) Performed: * No procedures listed *    Final Anesthesia Type: MAC      Patient location during evaluation: GI PACU  Patient participation: Yes- Able to Participate  Level of consciousness: awake and alert, awake and oriented  Post-procedure vital signs: reviewed and stable  Pain management: adequate  Airway patency: patent    PONV status at discharge: No PONV  Anesthetic complications: no      Cardiovascular status: blood pressure returned to baseline  Respiratory status: unassisted, room air and spontaneous ventilation  Hydration status: euvolemic  Follow-up not needed.              Vitals Value Taken Time   /74 05/20/25 06:05   Temp 36.5 °C (97.7 °F) 05/20/25 06:05   Pulse 89 05/20/25 06:05   Resp 20 05/20/25 06:05   SpO2 99 % 05/20/25 06:05         No case tracking events are documented in the log.      Pain/Henry Score: No data recorded

## 2025-05-20 NOTE — DISCHARGE SUMMARY
[facility]  Discharge Note  Short Stay    Colonoscopy      OUTCOME: Patient tolerated treatment/procedure well without complication and is now ready for discharge.    DISPOSITION: Home or Self Care    FINAL DIAGNOSIS:  Normal Colonoscopy    FOLLOWUP: With primary care provider    DISCHARGE INSTRUCTIONS:  No discharge procedures on file.

## 2025-06-03 NOTE — PROGRESS NOTES
Chief Complaint:  Well Woman     History of Present Illness:  Neymar Nam is a 50 y.o. year old  presents for her well woman exam status post hysterectomy. No vaginal bleeding. C/o possible yeast infection.    Cancer-related family history is negative for Breast cancer, Ovarian cancer, Uterine cancer, and Cervical cancer.        Gyn History:  Menstrual History  Cycle: No  Menarche Age: 8 years  No Cycle Reason: (!) Surgical  Surgical Reason: hysterectomy    Menopause  Menopause Age: 25 years  Post Menopausal Bleeding: No  Hormone Replacement Therapy: No    Pap History  HPV Vaccine Completed: No    [place]  Sexually Active: Yes  Sexual Orientation: heterosexual  Postcoital Bleeding: No  Dyspareunia: No  STI History: No  Contraception: N/a    Breast History  Last Breast Imaging Date: Yes  Date: 24  History of Abnormal Breast Imaging : (!) Yes ((Left breast US and DX MMG - benign)  Date: 24  History of Breast Biopsy: No        Review of Systems:  General/Constitutional: Chills denies. Fatigue/weakness denies. Fever denies. Night sweats denies. Hot flashes denies    Respiratory: Cough denies. Hemoptysis denies. SOB denies. Sputum production denies. Wheezing denies .   Cardiovascular: Chest pain denies. Dizziness denies. Palpitations denies. Swelling in hands/feet denies.                Breast: Dimpling denies. Breast mass denies. Breast pain/tenderness denies. Nipple discharge denies. Puckering denies.  Gastrointestinal: Abdominal pain denies. Blood in stool denies. Constipation denies. Diarrhea denies. Heartburn denies. Nausea denies. Vomiting denies    Genitourinary: Incontinence denies. Blood in urine denies. Frequent urination denies. Painful urination denies. Urinary urgency denies. Nocturia denies    Gynecologic: Irregular menses denies. Heavy bleeding denies. Painful menses denies. Vaginal discharge denies. Vaginal odor denies. Vaginal itching admits. Vaginal lesion denies. Pelvic  "pain denies. Decreased libido denies. Vulvar lesion denies. Prolapse of genital organs denies. Painful intercourse denies. Postcoital bleeding denies    Psychiatric: Depression denies. Anxiety denies.     OB History    Para Term  AB Living   2 2 2 0 0 2   SAB IAB Ectopic Multiple Live Births   0 0 0 0 2      # 1 - Date: 95, Sex: Female, Weight: None, GA: None, Type: Vaginal, Spontaneous, Apgar1: None, Apgar5: None, Living: Living, Birth Comments: None    # 2 - Date: 97, Sex: Male, Weight: None, GA: None, Type: Vaginal, Spontaneous, Apgar1: None, Apgar5: None, Living: Living, Birth Comments: None      Past Medical History:   Diagnosis Date    Crohn's disease     Diabetes mellitus     GERD (gastroesophageal reflux disease)      Current Medications[1]    Review of patient's allergies indicates:   Allergen Reactions    Diflucan [fluconazole] Hives       Past Surgical History:   Procedure Laterality Date    COLONOSCOPY      x 3    EGD, WITH BALLOON DILATION N/A     HYSTERECTOMY      RECTAL SURGERY  2017    tear in rectum, cyst by tailbone inside of rectum    TUBAL LIGATION       Family History   Problem Relation Name Age of Onset    Breast cancer Neg Hx      Colon cancer Neg Hx      Ovarian cancer Neg Hx      Uterine cancer Neg Hx      Cervical cancer Neg Hx       Social History[2]    Physical Exam:  /82 (BP Location: Right arm, Patient Position: Sitting)   Temp 97.7 °F (36.5 °C)   Ht 5' 2" (1.575 m)   Wt 89.8 kg (198 lb)   BMI 36.21 kg/m²     Chaperone: present.       General appearance: healthy, well-nourished and well-developed     Psychiatric: Orientation to time, place and person. Normal mood and affect and active, alert     Skin: Appearance: no rashes or lesions.     Neck:   Neck: supple, FROM, trachea midline. and no masses   Thyroid: no enlargement or nodules and non-tender.       Cardiovascular:   Auscultation: RRR and no murmur.   Peripheral Vascular: no varicosities, LLE " edema, RLE edema, calf tenderness, and palpable cord and pedal pulses intact.     Lungs:   Respiratory effort: no intercostal retractions or accessory muscle usage.   Auscultation: no wheezing, rales/crackles, or rhonchi and clear to auscultation.     Breast:   Inspection/Palpation: no tenderness, discrete/distinct masses, skin changes, or abnormal secretions. Nipple appearance normal.     Abdomen:   Auscultation/Inspection/Palpation: no hepatomegaly, splenomegaly, masses, tenderness or CVA tenderness and soft, non-distended bowel sounds preset.    Hernia: no palpable hernias.     Female Genitalia:    Vulva: no masses, tenderness or lesions    Bladder/Urethra: no urethral discharge or mass, normal meatus, bladder non-distended.    Vagina: no tenderness, erythema, cystocele, rectocele,  or vesicle(s) or ulcers +thick, white vaginal discharge   Cuff intact, no masses, NT   Adnexa/Parametria: no parametrial tenderness or mass, no adnexal tenderness or ovarian mass.     Lymph Nodes:   Palpation: non tender submandibular nodes, axillary nodes, or inguinal nodes.     Rectal Exam:   Rectum: normal perianal skin.     Wet prep: +yeast    Assessment/Plan:  1. Well woman exam with routine gynecological exam    2. Vaginal discharge  -     POCT Wet Prep  -     POCT KOH    3. Vaginal candidiasis  -     clotrimazole-betamethasone 1-0.05% (LOTRISONE) cream; Apply topically 2 (two) times daily.  Dispense: 45 g; Refill: 1  -     fluconazole (DIFLUCAN) 200 MG Tab; Take 1 tablet (200 mg total) by mouth every other day. for 4 doses  Dispense: 4 tablet; Refill: 0    4. H/O: hysterectomy    5. Screening mammogram for breast cancer  -     Mammo Digital Screening Bilat w/ [facility] (XPD); Future; Expected date: 04/23/2025    6. Breast cancer screening by mammogram         No pap, s/p hyst, no h/o abnl pap   Recommend BSE monthly  Discussed recommendations of annual screening after age of 40 with mammogram  Explained that screening is not 100%  reliable. Advised patient if she notices any changes to her breast including a lump, mass, dimpling, discharge, rash, or tenderness she should contact us immediately.     Healthy diet, exercise   MMG  Multivitamin   Seat belt   Sunscreen use   Safe sex/ STI education   Annual labs: w/ PCP    Wet prep: yeast  Educated   Diflucan 200mg PO q 48hrs x4  Call office if no improvement in 72 hours   Lotrisone for itching    AVOID: Scented soaps or shampoos, bubble bath, scented detergents, feminine sprays, douches, powders          Fragrance-free pH neutral soap     Unscented detergents     Counseling:     A brief discussion of STD prevention was had.    Avoidance of cigarette smoking, alcohol use, and drug use was encouraged.    A healthy diet and regular exercise was stressed.    All questions were answered and the patient voiced understanding of the above issues                   [1]   Current Outpatient Medications:     MOUNJARO 2.5 mg/0.5 mL PnIj, Inject 2.5 mg into the skin every 7 days., Disp: , Rfl:     clotrimazole-betamethasone 1-0.05% (LOTRISONE) cream, Apply topically 2 (two) times daily., Disp: 45 g, Rfl: 1    tirzepatide (MOUNJARO) 5 mg/0.5 mL PnIj, Inject 5 mg into the skin every 7 days. Was  on 2.5 now up to 5mg   holding till after procedure   5/20/2025,  last taken on Tuesday 5/13/2025, Disp: , Rfl:   [2]   Social History  Socioeconomic History    Marital status:    Tobacco Use    Smoking status: Never     Passive exposure: Past    Smokeless tobacco: Never   Substance and Sexual Activity    Alcohol use: Not Currently    Drug use: Never    Sexual activity: Yes     Partners: Male     Birth control/protection: See Surgical Hx